# Patient Record
Sex: FEMALE | Race: WHITE | NOT HISPANIC OR LATINO | Employment: FULL TIME | URBAN - METROPOLITAN AREA
[De-identification: names, ages, dates, MRNs, and addresses within clinical notes are randomized per-mention and may not be internally consistent; named-entity substitution may affect disease eponyms.]

---

## 2021-04-13 ENCOUNTER — OFFICE VISIT (OUTPATIENT)
Dept: OBSTETRICS AND GYNECOLOGY | Facility: CLINIC | Age: 46
End: 2021-04-13
Payer: COMMERCIAL

## 2021-04-13 VITALS
HEIGHT: 65 IN | DIASTOLIC BLOOD PRESSURE: 118 MMHG | SYSTOLIC BLOOD PRESSURE: 176 MMHG | WEIGHT: 232 LBS | BODY MASS INDEX: 38.65 KG/M2

## 2021-04-13 DIAGNOSIS — Z12.31 ENCOUNTER FOR SCREENING MAMMOGRAM FOR MALIGNANT NEOPLASM OF BREAST: ICD-10-CM

## 2021-04-13 DIAGNOSIS — N92.0 MENORRHAGIA WITH REGULAR CYCLE: ICD-10-CM

## 2021-04-13 DIAGNOSIS — Z01.419 WELL WOMAN EXAM WITH ROUTINE GYNECOLOGICAL EXAM: Primary | ICD-10-CM

## 2021-04-13 DIAGNOSIS — Z12.11 SCREEN FOR COLON CANCER: ICD-10-CM

## 2021-04-13 DIAGNOSIS — Z00.00 ENCOUNTER FOR WELLNESS EXAMINATION: ICD-10-CM

## 2021-04-13 PROCEDURE — 99396 PREV VISIT EST AGE 40-64: CPT | Mod: S$GLB,,, | Performed by: OBSTETRICS & GYNECOLOGY

## 2021-04-13 PROCEDURE — 1126F PR PAIN SEVERITY QUANTIFIED, NO PAIN PRESENT: ICD-10-PCS | Mod: S$GLB,,, | Performed by: OBSTETRICS & GYNECOLOGY

## 2021-04-13 PROCEDURE — 99396 PR PREVENTIVE VISIT,EST,40-64: ICD-10-PCS | Mod: S$GLB,,, | Performed by: OBSTETRICS & GYNECOLOGY

## 2021-04-13 PROCEDURE — 3008F PR BODY MASS INDEX (BMI) DOCUMENTED: ICD-10-PCS | Mod: CPTII,S$GLB,, | Performed by: OBSTETRICS & GYNECOLOGY

## 2021-04-13 PROCEDURE — 1126F AMNT PAIN NOTED NONE PRSNT: CPT | Mod: S$GLB,,, | Performed by: OBSTETRICS & GYNECOLOGY

## 2021-04-13 PROCEDURE — 3008F BODY MASS INDEX DOCD: CPT | Mod: CPTII,S$GLB,, | Performed by: OBSTETRICS & GYNECOLOGY

## 2021-04-15 ENCOUNTER — PATIENT MESSAGE (OUTPATIENT)
Dept: OBSTETRICS AND GYNECOLOGY | Facility: CLINIC | Age: 46
End: 2021-04-15

## 2021-04-29 ENCOUNTER — TELEPHONE (OUTPATIENT)
Dept: OBSTETRICS AND GYNECOLOGY | Facility: CLINIC | Age: 46
End: 2021-04-29

## 2021-05-04 ENCOUNTER — PATIENT MESSAGE (OUTPATIENT)
Dept: OBSTETRICS AND GYNECOLOGY | Facility: CLINIC | Age: 46
End: 2021-05-04

## 2021-05-04 LAB
ABS NRBC COUNT: 0 X 10 3/UL (ref 0–0.01)
ABSOLUTE BASOPHIL: 0.07 X 10 3/UL (ref 0–0.22)
ABSOLUTE EOSINOPHIL: 0.18 X 10 3/UL (ref 0.04–0.54)
ABSOLUTE IMMATURE GRAN: 0.05 X 10 3/UL (ref 0–0.04)
ABSOLUTE LYMPHOCYTE: 2.59 X 10 3/UL (ref 0.86–4.75)
ABSOLUTE MONOCYTE: 0.69 X 10 3/UL (ref 0.22–1.08)
ALBUMIN SERPL-MCNC: 4 G/DL (ref 3.5–5.2)
ALBUMIN/GLOB SERPL ELPH: 1.3 {RATIO} (ref 1–2.7)
ALP ISOS SERPL LEV INH-CCNC: 96 U/L (ref 35–105)
ALT (SGPT): 18 U/L (ref 0–33)
ANION GAP SERPL CALC-SCNC: 12 MMOL/L (ref 8–17)
AST SERPL-CCNC: 17 U/L (ref 0–32)
BASOPHILS NFR BLD: 0.9 % (ref 0.2–1.2)
BILIRUBIN, TOTAL: 0.19 MG/DL (ref 0–1.2)
BUN/CREAT SERPL: 19 (ref 6–20)
CALCIUM SERPL-MCNC: 9.1 MG/DL (ref 8.6–10.2)
CARBON DIOXIDE, CO2: 24 MMOL/L (ref 22–29)
CHLORIDE: 106 MMOL/L (ref 98–107)
CHOLEST SERPL-MSCNC: 211 MG/DL (ref 100–200)
CREAT SERPL-MCNC: 0.77 MG/DL (ref 0.5–0.9)
EOSINOPHIL NFR BLD: 2.2 % (ref 0.7–7)
GFR ESTIMATION: 80.01
GLOBULIN: 3.2 G/DL (ref 1.5–4.5)
GLUCOSE: 99 MG/DL (ref 74–106)
HCT VFR BLD AUTO: 37.4 % (ref 37–47)
HDLC SERPL-MCNC: 49 MG/DL
HGB BLD-MCNC: 10.1 G/DL (ref 12–16)
IMMATURE GRANULOCYTES: 0.6 % (ref 0–0.5)
LDL/HDL RATIO: 2.6 (ref 1–3)
LDLC SERPL CALC-MCNC: 128.2 MG/DL (ref 0–100)
LYMPHOCYTES NFR BLD: 32.3 % (ref 19.3–53.1)
MCH RBC QN AUTO: 20.4 PG (ref 27–32)
MCHC RBC AUTO-ENTMCNC: 27 G/DL (ref 32–36)
MCV RBC AUTO: 75.4 FL (ref 82–100)
MONOCYTES NFR BLD: 8.6 % (ref 4.7–12.5)
NEUTROPHILS # BLD AUTO: 4.45 X 10 3/UL (ref 2.15–7.56)
NEUTROPHILS NFR BLD: 55.4 %
NUCLEATED RED BLOOD CELLS: 0 /100 WBC (ref 0–0.2)
PLATELET # BLD AUTO: 360 X 10 3/UL (ref 135–400)
POTASSIUM: 4.4 MMOL/L (ref 3.5–5.1)
PROT SNV-MCNC: 7.2 G/DL (ref 6.4–8.3)
RBC # BLD AUTO: 4.96 X 10 6/UL (ref 4.2–5.4)
RDW-SD: 43.1 FL (ref 37–54)
SODIUM: 142 MMOL/L (ref 136–145)
TRIGL SERPL-MCNC: 169 MG/DL (ref 0–150)
TSH SERPL DL<=0.005 MIU/L-ACNC: 2.46 UIU/ML (ref 0.27–4.2)
UREA NITROGEN (BUN): 14.6 MG/DL (ref 6–20)
WBC # BLD: 8.03 X 10 3/UL (ref 4.3–10.8)

## 2021-05-21 ENCOUNTER — PATIENT MESSAGE (OUTPATIENT)
Dept: OBSTETRICS AND GYNECOLOGY | Facility: CLINIC | Age: 46
End: 2021-05-21

## 2021-06-01 ENCOUNTER — PATIENT MESSAGE (OUTPATIENT)
Dept: OBSTETRICS AND GYNECOLOGY | Facility: CLINIC | Age: 46
End: 2021-06-01

## 2021-06-04 ENCOUNTER — PATIENT OUTREACH (OUTPATIENT)
Dept: ADMINISTRATIVE | Facility: HOSPITAL | Age: 46
End: 2021-06-04

## 2021-06-21 ENCOUNTER — PATIENT MESSAGE (OUTPATIENT)
Dept: OBSTETRICS AND GYNECOLOGY | Facility: CLINIC | Age: 46
End: 2021-06-21

## 2021-06-29 LAB
CALCIUM BLD-MCNC: POSITIVE MG/DL
COVID-19 AB, IGM: NEGATIVE

## 2021-06-30 ENCOUNTER — PATIENT MESSAGE (OUTPATIENT)
Dept: OBSTETRICS AND GYNECOLOGY | Facility: CLINIC | Age: 46
End: 2021-06-30

## 2021-12-27 ENCOUNTER — PATIENT MESSAGE (OUTPATIENT)
Dept: OBSTETRICS AND GYNECOLOGY | Facility: CLINIC | Age: 46
End: 2021-12-27
Payer: COMMERCIAL

## 2022-01-27 ENCOUNTER — PATIENT MESSAGE (OUTPATIENT)
Dept: OBSTETRICS AND GYNECOLOGY | Facility: CLINIC | Age: 47
End: 2022-01-27
Payer: COMMERCIAL

## 2023-01-16 NOTE — PROGRESS NOTES
Jennifer Jj is a 47 y.o. female  who presents for a well woman exam.  She has no issues, problems, or complaints. No periods in over a year. Very mild hot flashes.     Past Medical History:   Diagnosis Date    Abnormal Pap smear of cervix     Anemia     Hypertension        Past Surgical History:   Procedure Laterality Date    COLONOSCOPY  2021    repeat in 5 yrs    TONSILLECTOMY      WISDOM TOOTH EXTRACTION         OB History    Para Term  AB Living   3 3 3         SAB IAB Ectopic Multiple Live Births                  # Outcome Date GA Lbr Jamaal/2nd Weight Sex Delivery Anes PTL Lv   3 Term      Vag-Spont      2 Term      Vag-Spont      1 Term      Vag-Spont          Family History   Problem Relation Age of Onset    Melanoma Mother     Cancer Father         throat / larynx cancer    Diabetes Brother         1/2 brother    Breast cancer Neg Hx     Colon cancer Neg Hx     Ovarian cancer Neg Hx     Uterine cancer Neg Hx     Cervical cancer Neg Hx        Social History     Tobacco Use    Smoking status: Never    Smokeless tobacco: Never   Substance Use Topics    Alcohol use: Not Currently     Comment: very rarely    Drug use: Never         Current Outpatient Medications:     lisinopriL-hydrochlorothiazide (PRINZIDE,ZESTORETIC) 20-12.5 mg per tablet, , Disp: , Rfl:      Review of patient's allergies indicates:  No Known Allergies     ROS:  GENERAL: Denies weight gain or weight loss. Feeling well overall.   SKIN: Denies rash or lesions.   HEAD: Denies head injury or headache.   NODES: Denies enlarged lymph nodes.   CHEST: Denies shortness of breath.   CARDIOVASCULAR: Denies palpitations or chest pain.   ABDOMEN: Denies abdominal pain, constipation, diarrhea, nausea, vomiting or rectal bleeding.   URINARY: Denies frequency, dysuria, hematuria, or burning on urination.  REPRODUCTIVE: See HPI.   BREASTS: Denies pain, lumps, or nipple discharge.   HEMATOLOGIC: Denies easy bruisability or  "excessive bleeding.  MUSCULOSKELETAL: Denies joint pain or swelling.   NEUROLOGIC: Denies syncope or weakness.   PSYCHIATRIC: Denies depression, anxiety or mood swings.    PHYSICAL EXAM:    /82   Pulse 73   Ht 5' 5" (1.651 m)   Wt 95.3 kg (210 lb)   LMP 04/05/2021 (Within Days)   BMI 34.95 kg/m²    Body mass index is 34.95 kg/m².     APPEARANCE: Well nourished, well developed, in no acute distress.  AFFECT: WNL, alert and oriented x 3  SKIN: No acne or hirsutism  NECK: Neck symmetric without masses or thyromegaly  NODES: No inguinal, cervical, axillary, or femoral lymph node enlargement  CHEST: Good respiratory effect  ABDOMEN: Soft.  No tenderness or masses.  No hepatosplenomegaly.  No hernias.  BREASTS: Symmetrical, no skin changes or visible lesions.  No palpable masses, nipple discharge bilaterally.  PELVIC: Normal external genitalia without lesions.  Normal hair distribution.  Adequate perineal body, normal urethral meatus.  Urethra and bladder without tenderness or masses. Vagina moist and well rugated without lesions or discharge.  Cervix pink, without lesions, discharge or tenderness.  No significant cystocele or rectocele.  Bimanual exam shows uterus to be normal size, regular, mobile and nontender.  Adnexa without masses or tenderness.    EXTREMITIES: No edema.     Well woman exam with routine gynecological exam  -     Liquid-based pap smear, screening    Screening mammogram, encounter for  -     Mammo Digital Screening Bilat w/ [facility]; Future         Patient was counseled today on A.C.S. Pap guidelines and recommendations for yearly pelvic exams, mammograms and monthly self breast exams; to see her PCP for other health maintenance.     Follow up in 1 year          "

## 2023-01-18 ENCOUNTER — OFFICE VISIT (OUTPATIENT)
Dept: OBSTETRICS AND GYNECOLOGY | Facility: CLINIC | Age: 48
End: 2023-01-18
Payer: COMMERCIAL

## 2023-01-18 VITALS
DIASTOLIC BLOOD PRESSURE: 82 MMHG | HEIGHT: 65 IN | BODY MASS INDEX: 34.99 KG/M2 | SYSTOLIC BLOOD PRESSURE: 127 MMHG | WEIGHT: 210 LBS | HEART RATE: 73 BPM

## 2023-01-18 DIAGNOSIS — Z01.419 WELL WOMAN EXAM WITH ROUTINE GYNECOLOGICAL EXAM: Primary | ICD-10-CM

## 2023-01-18 DIAGNOSIS — Z12.31 SCREENING MAMMOGRAM, ENCOUNTER FOR: ICD-10-CM

## 2023-01-18 PROCEDURE — 99396 PR PREVENTIVE VISIT,EST,40-64: ICD-10-PCS | Mod: S$GLB,,, | Performed by: OBSTETRICS & GYNECOLOGY

## 2023-01-18 PROCEDURE — 1159F PR MEDICATION LIST DOCUMENTED IN MEDICAL RECORD: ICD-10-PCS | Mod: CPTII,S$GLB,, | Performed by: OBSTETRICS & GYNECOLOGY

## 2023-01-18 PROCEDURE — 3074F PR MOST RECENT SYSTOLIC BLOOD PRESSURE < 130 MM HG: ICD-10-PCS | Mod: CPTII,S$GLB,, | Performed by: OBSTETRICS & GYNECOLOGY

## 2023-01-18 PROCEDURE — 3079F DIAST BP 80-89 MM HG: CPT | Mod: CPTII,S$GLB,, | Performed by: OBSTETRICS & GYNECOLOGY

## 2023-01-18 PROCEDURE — 3074F SYST BP LT 130 MM HG: CPT | Mod: CPTII,S$GLB,, | Performed by: OBSTETRICS & GYNECOLOGY

## 2023-01-18 PROCEDURE — 3008F BODY MASS INDEX DOCD: CPT | Mod: CPTII,S$GLB,, | Performed by: OBSTETRICS & GYNECOLOGY

## 2023-01-18 PROCEDURE — 1159F MED LIST DOCD IN RCRD: CPT | Mod: CPTII,S$GLB,, | Performed by: OBSTETRICS & GYNECOLOGY

## 2023-01-18 PROCEDURE — 3008F PR BODY MASS INDEX (BMI) DOCUMENTED: ICD-10-PCS | Mod: CPTII,S$GLB,, | Performed by: OBSTETRICS & GYNECOLOGY

## 2023-01-18 PROCEDURE — 99396 PREV VISIT EST AGE 40-64: CPT | Mod: S$GLB,,, | Performed by: OBSTETRICS & GYNECOLOGY

## 2023-01-18 PROCEDURE — 3079F PR MOST RECENT DIASTOLIC BLOOD PRESSURE 80-89 MM HG: ICD-10-PCS | Mod: CPTII,S$GLB,, | Performed by: OBSTETRICS & GYNECOLOGY

## 2023-01-18 RX ORDER — LISINOPRIL AND HYDROCHLOROTHIAZIDE 12.5; 2 MG/1; MG/1
TABLET ORAL
COMMUNITY
Start: 2022-12-08

## 2023-01-23 LAB — Lab: NORMAL

## 2024-01-19 NOTE — PROGRESS NOTES
CC:  WELL WOMAN (menopausal since )  Patient Care Team:  Suzanna Rivera MD as PCP - General (Internal Medicine)  Saundra Ren MD as Consulting Physician (Gastroenterology)    NEW PATIENT       HPI:  Patient is a 49 y.o. who presents for her well woman exam today.  History reviewed with patient.   Patient is without complaints or concerns today.     HRT:  never used  HX ABNL PAPS: none    REVIEW OF PRIOR DATA/ HEALTH MAINTENANCE:  LAST ANNUAL:   2023    LAST MM[facility] (screening)- 2022-  [facility]   LAST LABS- does with PCP    LAST COLONOSCOPY- - by Dr. Ren  -q 5 yrs for polyps     Past Medical History:   Diagnosis Date    Abnormal Pap smear of cervix     Anemia     Hypertension      SURGICAL HX:   has a past surgical history that includes Tonsillectomy; Vowinckel tooth extraction; and Colonoscopy (2021).    SOCIAL HX:    reports that she has never smoked. She has never used smokeless tobacco. She reports that she does not currently use alcohol. She reports that she does not use drugs.    FAMILY HX:   family history includes Cancer in her father; Diabetes in her brother; Melanoma in her mother. .    ALLERGIES:  Patient has no known allergies.    Current Outpatient Medications   Medication Instructions    lisinopriL-hydrochlorothiazide (PRINZIDE,ZESTORETIC) 20-12.5 mg per tablet No dose, route, or frequency recorded.     ROS:  CONST:  No fever, chills, fatigue or unexpected changes in weight   CV: No chest pain or palpitations   RESP:  No shortness of breath or cough   GI: No abd pain, vomiting, diarrhea, blood in stool, or changes in bowel mvmts   SKIN: No rashes or lesions  MUSCULOSKELETAL: No joint swelling or pain   PSYCH: No changes in mood or insomia   BREASTS: No asymmetry, lumps, pain, nipple discharge, or skin changes   :  No dysuria, urgency, frequency, hematuria or incontinence           No vag dc, itching, odor or dryness           No pelvic pain, dyspareunia, or  abnormal vaginal bleeding     VITALS:  Blood pressure (!) 149/88, weight 92.8 kg (204 lb 9.6 oz), last menstrual period 04/05/2021.  Body mass index is 34.05 kg/m².     PHYSICAL EXAM-  APPEARANCE: Well appearing, in no acute distress.   NECK: Neck symmetric   CV:  Normal rate   PULM: Normal resp rate, no resp distress, normal resp effort   PSYCH:  Normal mood and affect, cooperative   SKIN: No rashes, lesions, or abnormal bruising   LYMPH: No inguinal or axillary adenopathy   ABD: Soft, without tenderness or masses.   BREAST: Symmetrical, no nipple changes, no skin changes, No palpable masses   PELVIC:  VULVA: Normal female genitalia. No lesions.   URETHRAL MEATUS: No masses, no significant prolapse.   BLADDER/ URETHRA: No masses or suprapubic tenderness   VAGINA: No lesions. +atrophic changes. No discharge   CVX: No lesions   UTERUS: Normal size/shape, mobile, non-tender   ADNEXA: No masses, tenderness, or fullness   ANUS/ PERINEUM: Normal tone.  No lesions.     *female chaperone present for entire exam    ASSESSMENT and PLAN:  Encounter for well woman exam with routine gynecological exam  -     Liquid-based pap smear, screening    Breast cancer screening by mammogram  -     Mammo Digital Screening Bilat w/ [facility]; Future; Expected date: 02/02/2024    Menopausal state       FOLLOWUP:  1 year for wwe or sooner prn    COUNSELING:  Patient was counseled today on recommendation for yearly pelvic exam, current Pap guidelines, self breast exams, annual screening mammograms, routine screening colonoscopy , and bone density testing.  Discussed vitamin D and calcium supplements as well as weight bearing exercise to decrease risks. Encouraged patient to see her PCP for other health maintenance.

## 2024-01-23 ENCOUNTER — OFFICE VISIT (OUTPATIENT)
Dept: OBSTETRICS AND GYNECOLOGY | Facility: CLINIC | Age: 49
End: 2024-01-23
Payer: COMMERCIAL

## 2024-01-23 VITALS
DIASTOLIC BLOOD PRESSURE: 88 MMHG | WEIGHT: 204.63 LBS | SYSTOLIC BLOOD PRESSURE: 149 MMHG | BODY MASS INDEX: 34.05 KG/M2

## 2024-01-23 DIAGNOSIS — Z12.31 BREAST CANCER SCREENING BY MAMMOGRAM: ICD-10-CM

## 2024-01-23 DIAGNOSIS — Z01.419 ENCOUNTER FOR WELL WOMAN EXAM WITH ROUTINE GYNECOLOGICAL EXAM: Primary | ICD-10-CM

## 2024-01-23 DIAGNOSIS — N95.1 MENOPAUSAL STATE: ICD-10-CM

## 2024-01-23 PROCEDURE — 3079F DIAST BP 80-89 MM HG: CPT | Mod: CPTII,S$GLB,, | Performed by: OBSTETRICS & GYNECOLOGY

## 2024-01-23 PROCEDURE — 99396 PREV VISIT EST AGE 40-64: CPT | Mod: S$GLB,,, | Performed by: OBSTETRICS & GYNECOLOGY

## 2024-01-23 PROCEDURE — 3008F BODY MASS INDEX DOCD: CPT | Mod: CPTII,S$GLB,, | Performed by: OBSTETRICS & GYNECOLOGY

## 2024-01-23 PROCEDURE — 3077F SYST BP >= 140 MM HG: CPT | Mod: CPTII,S$GLB,, | Performed by: OBSTETRICS & GYNECOLOGY

## 2024-01-23 PROCEDURE — 1159F MED LIST DOCD IN RCRD: CPT | Mod: CPTII,S$GLB,, | Performed by: OBSTETRICS & GYNECOLOGY

## 2024-01-25 LAB — Lab: NORMAL

## 2024-03-14 ENCOUNTER — DOCUMENTATION ONLY (OUTPATIENT)
Dept: OBSTETRICS AND GYNECOLOGY | Facility: CLINIC | Age: 49
End: 2024-03-14
Payer: COMMERCIAL

## 2025-01-28 ENCOUNTER — OFFICE VISIT (OUTPATIENT)
Dept: OBSTETRICS AND GYNECOLOGY | Facility: CLINIC | Age: 50
End: 2025-01-28
Payer: COMMERCIAL

## 2025-01-28 VITALS
BODY MASS INDEX: 37.32 KG/M2 | WEIGHT: 224 LBS | DIASTOLIC BLOOD PRESSURE: 93 MMHG | HEIGHT: 65 IN | SYSTOLIC BLOOD PRESSURE: 168 MMHG

## 2025-01-28 DIAGNOSIS — Z01.419 ENCOUNTER FOR WELL WOMAN EXAM WITH ROUTINE GYNECOLOGICAL EXAM: Primary | ICD-10-CM

## 2025-01-28 DIAGNOSIS — Z12.31 BREAST CANCER SCREENING BY MAMMOGRAM: ICD-10-CM

## 2025-01-28 PROCEDURE — 1159F MED LIST DOCD IN RCRD: CPT | Mod: CPTII,,, | Performed by: OBSTETRICS & GYNECOLOGY

## 2025-01-28 PROCEDURE — 3080F DIAST BP >= 90 MM HG: CPT | Mod: CPTII,,, | Performed by: OBSTETRICS & GYNECOLOGY

## 2025-01-28 PROCEDURE — 3008F BODY MASS INDEX DOCD: CPT | Mod: CPTII,,, | Performed by: OBSTETRICS & GYNECOLOGY

## 2025-01-28 PROCEDURE — 3077F SYST BP >= 140 MM HG: CPT | Mod: CPTII,,, | Performed by: OBSTETRICS & GYNECOLOGY

## 2025-01-28 PROCEDURE — 99396 PREV VISIT EST AGE 40-64: CPT | Mod: S$PBB,,, | Performed by: OBSTETRICS & GYNECOLOGY

## 2025-01-28 RX ORDER — SOD SULF/POT CHLORIDE/MAG SULF 1.479 G
TABLET ORAL
COMMUNITY

## 2025-01-30 LAB — Lab: NORMAL
